# Patient Record
Sex: FEMALE | Race: WHITE | Employment: FULL TIME | URBAN - METROPOLITAN AREA
[De-identification: names, ages, dates, MRNs, and addresses within clinical notes are randomized per-mention and may not be internally consistent; named-entity substitution may affect disease eponyms.]

---

## 2017-04-13 ENCOUNTER — OFFICE VISIT (OUTPATIENT)
Dept: PHYSICAL THERAPY | Facility: HOSPITAL | Age: 22
End: 2017-04-13
Attending: UROLOGY
Payer: COMMERCIAL

## 2017-04-13 DIAGNOSIS — R39.89 BLADDER PAIN: Primary | ICD-10-CM

## 2017-04-13 PROCEDURE — 97162 PT EVAL MOD COMPLEX 30 MIN: CPT

## 2017-04-13 PROCEDURE — 97110 THERAPEUTIC EXERCISES: CPT

## 2017-04-13 PROCEDURE — 97535 SELF CARE MNGMENT TRAINING: CPT

## 2017-04-13 NOTE — PROGRESS NOTES
PELVIC FLOOR EVALUATION:   Referring Physician: Dr. Johana Overton  Diagnosis: Bladder pain (R39.89)  Date of Onset: 2/13/17     Date of Service: 4/13/2017     PATIENT SUMMARY   Jessy Kolb is a 19year old y/o female who presents to therapy today with c pain, dyspaerunia, constipation with pain during BM. Objective testing demonstrates signs and symptoms concurrent with vaginismus due to inability to perform internal examination past layer 1 with severe restrictions, pain and trigger points present.   Abl management   4.  Pt to improve PFDI score to less than 20% impaired    This plan was discussed and agreed upon by: patient       OBJECTIVE:     Red Flag Screening Yes/No Comments   Age over 48 no    Bowel or bladder Dysfunction/Saddle Anesthesisa no    Hist defecation La:  Yes   Laxative use: No    SEXUAL HEALTH STATUS  History of Sexual Abuse: no  Sexual [place] Status: participating - very unsatisfied with current status  Pain with initial penetration: yes  Pain with deep penetration: yes  How long does Umbilicus: 0    PELVIC EXAMINATION  Verbal consent given internal examination: Yes    External Observation  Mons pubis: WNL  Labia majora: WNL  Labia minora: redness  Urethral meatus: redness  Introitus: redness  Perineal body: other: elevated  Pelvic cloc co-sign or sign and return this letter via fax as soon as possible to [phone]. If you have any questions, please contact me at Dept: [phone]    Sincerely,  Electronically signed by:    Therapist: Elene Mortimer, PT, DPT, OCS  4/13/2017 10:40 AM

## 2017-04-20 ENCOUNTER — OFFICE VISIT (OUTPATIENT)
Dept: PHYSICAL THERAPY | Facility: HOSPITAL | Age: 22
End: 2017-04-20
Attending: UROLOGY
Payer: COMMERCIAL

## 2017-04-20 PROCEDURE — 97140 MANUAL THERAPY 1/> REGIONS: CPT

## 2017-04-20 NOTE — PROGRESS NOTES
Goals     • Therapy Goals          Long Term Goals (Time Frame 12 visits) by 6/30/17              1. Pt to report reduction of urination frequency with HEP activities to every 2-3 hours for improved bladder health              2. Pt to demonstrate improv valium medication to improve therapy outcomes. The patient would benefit from the skilled intervention of a physical therapist for the impairments and functional limitations noted above.      Rehab Potential: fair  Limiting factors: other: severity of tissu inflammation.  Tolerated session moderately, although continues to report same 9/10 pelvic pain post-tx without improvements.   Issued adductor stretch to add to HEP activities.  Plan to teach standing abdominal stretch and continue addressing soft tissue r

## 2017-04-27 ENCOUNTER — OFFICE VISIT (OUTPATIENT)
Dept: PHYSICAL THERAPY | Facility: HOSPITAL | Age: 22
End: 2017-04-27
Attending: UROLOGY
Payer: COMMERCIAL

## 2017-04-27 PROCEDURE — 97140 MANUAL THERAPY 1/> REGIONS: CPT

## 2017-04-27 NOTE — PROGRESS NOTES
Goals     • Therapy Goals          Long Term Goals (Time Frame 12 visits) by 6/30/17              1. Pt to report reduction of urination frequency with HEP activities to every 2-3 hours for improved bladder health              2. Pt to demonstrate improv valium medication to improve therapy outcomes. The patient would benefit from the skilled intervention of a physical therapist for the impairments and functional limitations noted above.      Rehab Potential: fair  Limiting factors: other: severity of tissu direction with aims to loosen adhesions, reduce pain, increase lymphatic flow, increase circulation and increase waste removal of inflammation.  Tolerated session moderately, although continues to report same 9/10 pelvic pain post-tx without improvements.

## 2017-05-04 ENCOUNTER — OFFICE VISIT (OUTPATIENT)
Dept: PHYSICAL THERAPY | Facility: HOSPITAL | Age: 22
End: 2017-05-04
Attending: UROLOGY
Payer: COMMERCIAL

## 2017-05-04 PROCEDURE — 97140 MANUAL THERAPY 1/> REGIONS: CPT

## 2017-05-04 NOTE — PROGRESS NOTES
Goals     • Therapy Goals          Long Term Goals (Time Frame 12 visits) by 6/30/17              1. Pt to report reduction of urination frequency with HEP activities to every 2-3 hours for improved bladder health              2. Pt to demonstrate improv valium medication to improve therapy outcomes. The patient would benefit from the skilled intervention of a physical therapist for the impairments and functional limitations noted above.      Rehab Potential: fair  Limiting factors: other: severity of tissu of the abdominals, hip flexors and adductors found on initial evaluation and are contributing to pelvic floor restrictions with soft tissue mobilization including effleurage and pétrissage, myofascial release, and connective tissue skin rolling in centripe vaginal pain returned escalating over the past 3 days from minimal to 5-6/10 pain currently and reports feeling therapy is really helping.   Focused session on addressing soft tissue restrictions of the abdominals, hip flexors and adductors found on initial

## 2017-05-18 ENCOUNTER — OFFICE VISIT (OUTPATIENT)
Dept: PHYSICAL THERAPY | Facility: HOSPITAL | Age: 22
End: 2017-05-18
Attending: UROLOGY
Payer: COMMERCIAL

## 2017-05-18 PROCEDURE — 97140 MANUAL THERAPY 1/> REGIONS: CPT

## 2017-05-18 NOTE — PROGRESS NOTES
Goals     • Therapy Goals          Long Term Goals (Time Frame 12 visits) by 6/30/17              1. Pt to report reduction of urination frequency with HEP activities to every 2-3 hours for improved bladder health              2. Pt to demonstrate improv valium medication to improve therapy outcomes. The patient would benefit from the skilled intervention of a physical therapist for the impairments and functional limitations noted above.      Rehab Potential: fair  Limiting factors: other: severity of tissu during cough and most likely contributing to the pain not getting better with the HEP activities yet.   Focused session on addressing soft tissue restrictions of the abdominals, hip flexors and adductors found on initial evaluation and are contributing to p OCS  4/27/2017 1:32 PM    5/4/2017: Pt returns to clinic with improving symptoms to nearly no pain noted for 2 days after our last treatment and then the vaginal pain returned escalating over the past 3 days from minimal to 5-6/10 pain currently and report diaphragmatic breathing. Tolerated well and tissue released to treatment, reports pain reduced to 2/10 post-tx.   Educated pt on positioning and use of therawand 3x/week for 5-10 minutes this week to maintain proper resting tone of pelvic floor, verbalized

## 2017-05-25 ENCOUNTER — OFFICE VISIT (OUTPATIENT)
Dept: PHYSICAL THERAPY | Facility: HOSPITAL | Age: 22
End: 2017-05-25
Attending: UROLOGY
Payer: COMMERCIAL

## 2017-05-25 PROCEDURE — 97140 MANUAL THERAPY 1/> REGIONS: CPT

## 2017-05-25 PROCEDURE — 97110 THERAPEUTIC EXERCISES: CPT

## 2017-06-01 ENCOUNTER — APPOINTMENT (OUTPATIENT)
Dept: PHYSICAL THERAPY | Facility: HOSPITAL | Age: 22
End: 2017-06-01
Attending: UROLOGY
Payer: COMMERCIAL

## 2017-06-01 NOTE — PROGRESS NOTES
Goals     • Therapy Goals          Long Term Goals (Time Frame 12 visits) by 6/30/17              1. Pt to report reduction of urination frequency with HEP activities to every 2-3 hours for improved bladder health              2. Pt to demonstrate improv valium medication to improve therapy outcomes. The patient would benefit from the skilled intervention of a physical therapist for the impairments and functional limitations noted above.      Rehab Potential: fair  Limiting factors: other: severity of tissu coughing lots this week and wonders if this is causing increased pelvic pain. Educated pt on use of pelvic floor to support organ during cough and most likely contributing to the pain not getting better with the HEP activities yet.   Focused session on add tissue restrictions manually and incorporate internal pelvic muscles as able to tolerate.       Therapist: Buddy Mitchell, PT, DPT, OCS  4/27/2017 1:32 PM    5/4/2017: Pt returns to clinic with improving symptoms to nearly no pain noted for 2 days after o sup trans perineal and  Bulbocavernosus muscles which are addressed with theils massage and soft tissue distraction stretching with diaphragmatic breathing. Tolerated well and tissue released to treatment, reports pain reduced to 2/10 post-tx.   Educated p referral for gynecology due to not having a current gynecologist.  [facility] [place] scheduling information. No treatment provided today and plan to continue PT after discharge is diagnosed and resolved or being treated.    Therapist: Sherrod Harada, P

## 2017-06-22 ENCOUNTER — OFFICE VISIT (OUTPATIENT)
Dept: PHYSICAL THERAPY | Facility: HOSPITAL | Age: 22
End: 2017-06-22
Attending: UROLOGY
Payer: COMMERCIAL

## 2017-06-22 PROCEDURE — 97140 MANUAL THERAPY 1/> REGIONS: CPT

## 2017-06-22 NOTE — PROGRESS NOTES
Goals     • Therapy Goals          Long Term Goals (Time Frame 12 visits) by 6/30/17              1. Pt to report reduction of urination frequency with HEP activities to every 2-3 hours for improved bladder health              2. Pt to demonstrate improv valium medication to improve therapy outcomes. The patient would benefit from the skilled intervention of a physical therapist for the impairments and functional limitations noted above.      Rehab Potential: fair  Limiting factors: other: severity of tissu 9/10 vaginal and lower abdominal cramping pain currently and reports she has been coughing lots this week and wonders if this is causing increased pelvic pain.   Educated pt on use of pelvic floor to support organ during cough and most likely contributing t post-tx.    Plan to teach standing abdominal stretch and continue addressing soft tissue restrictions manually and incorporate internal pelvic muscles as able to tolerate.       Therapist: Huma Crowell, PT, DPT, OCS  4/27/2017 1:32 PM    5/4/2017: Pt ret from worsening. Demonstrates moderate hypertonicity and trigger points in the L sup trans perineal and  Bulbocavernosus muscles which are addressed with theils massage and soft tissue distraction stretching with diaphragmatic breathing.   Tolerated well an resolved. Pt requests to cancel appointment but is seeking guidance on a referral for gynecology due to not having a current gynecologist.  [facility] scheduling information.   No treatment provided today and plan to continue PT after discharge i

## 2017-06-29 ENCOUNTER — OFFICE VISIT (OUTPATIENT)
Dept: PHYSICAL THERAPY | Facility: HOSPITAL | Age: 22
End: 2017-06-29
Attending: UROLOGY
Payer: COMMERCIAL

## 2017-06-29 PROCEDURE — 97140 MANUAL THERAPY 1/> REGIONS: CPT

## 2017-06-29 NOTE — PROGRESS NOTES
Goals     • Therapy Goals                Long Term Goals (Time Frame 12 visits) by 6/30/17              1. Pt to report reduction of urination frequency with HEP activities to every 2-3 hours for improved bladder health              2. Pt to demonstrate vaginal valium medication to improve therapy outcomes. The patient would benefit from the skilled intervention of a physical therapist for the impairments and functional limitations noted above.      Rehab Potential: fair  Limiting factors: other: severity with aims to loosen adhesions, reduce pain, increase lymphatic flow, increase circulation and increase waste removal of inflammation.  Tolerated session moderately, although continues to report same 9/10 pelvic pain post-tx without improvements.   Issued ad contributing to pelvic floor restrictions with soft tissue mobilization including effleurage and pétrissage, myofascial release, and connective tissue skin rolling in centripedal direction with aims to loosen adhesions, reduce pain, increase lymphatic flow WILLIET, OCS  5/18/2017 12:29 PM    5/25/2017: Pt returns to clinic without pain currently and reports the only pain she has had in the past week is lower abdominal (sub-umbilicus) pain for up to 30 minutes following intercourse in which she did not achieve an hypertonicity and trigger points in the L/R sup trans perineal and B Bulbocavernosus muscles which are addressed with theils massage and soft tissue distraction stretching with diaphragmatic breathing.   Tolerated well and tissue released to treatment, repo

## 2020-10-19 NOTE — PROGRESS NOTES
Goals     • Therapy Goals          Long Term Goals (Time Frame 12 visits) by 6/30/17              1. Pt to report reduction of urination frequency with HEP activities to every 2-3 hours for improved bladder health              2. Pt to demonstrate improv valium medication to improve therapy outcomes. The patient would benefit from the skilled intervention of a physical therapist for the impairments and functional limitations noted above.      Rehab Potential: fair  Limiting factors: other: severity of tissu coughing lots this week and wonders if this is causing increased pelvic pain. Educated pt on use of pelvic floor to support organ during cough and most likely contributing to the pain not getting better with the HEP activities yet.   Focused session on add tissue restrictions manually and incorporate internal pelvic muscles as able to tolerate.       Therapist: Ros Marrero PT, DPT, OCS  4/27/2017 1:32 PM    5/4/2017: Pt returns to clinic with improving symptoms to nearly no pain noted for 2 days after o sup trans perineal and  Bulbocavernosus muscles which are addressed with theils massage and soft tissue distraction stretching with diaphragmatic breathing. Tolerated well and tissue released to treatment, reports pain reduced to 2/10 post-tx.   Educated p No

## 2022-02-08 ENCOUNTER — OFFICE VISIT (OUTPATIENT)
Dept: OBGYN CLINIC | Facility: CLINIC | Age: 27
End: 2022-02-08
Payer: COMMERCIAL

## 2022-02-08 VITALS
DIASTOLIC BLOOD PRESSURE: 70 MMHG | BODY MASS INDEX: 30.58 KG/M2 | SYSTOLIC BLOOD PRESSURE: 110 MMHG | WEIGHT: 179.13 LBS | HEIGHT: 64 IN

## 2022-02-08 DIAGNOSIS — Z30.014 ENCOUNTER FOR INITIAL PRESCRIPTION OF INTRAUTERINE CONTRACEPTIVE DEVICE (IUD): ICD-10-CM

## 2022-02-08 DIAGNOSIS — Z01.419 WOMEN'S ANNUAL ROUTINE GYNECOLOGICAL EXAMINATION: Primary | ICD-10-CM

## 2022-02-08 DIAGNOSIS — Z11.3 ROUTINE SCREENING FOR STI (SEXUALLY TRANSMITTED INFECTION): ICD-10-CM

## 2022-02-08 PROBLEM — Z30.09 CONTRACEPTIVE EDUCATION: Status: ACTIVE | Noted: 2022-02-08

## 2022-02-08 PROBLEM — Z30.011 ENCOUNTER FOR INITIAL PRESCRIPTION OF CONTRACEPTIVE PILLS: Status: ACTIVE | Noted: 2022-02-08

## 2022-02-08 PROBLEM — Z30.09 CONTRACEPTIVE EDUCATION: Status: RESOLVED | Noted: 2022-02-08 | Resolved: 2022-02-08

## 2022-02-08 PROCEDURE — 87591 N.GONORRHOEAE DNA AMP PROB: CPT | Performed by: OBSTETRICS & GYNECOLOGY

## 2022-02-08 PROCEDURE — 3078F DIAST BP <80 MM HG: CPT | Performed by: OBSTETRICS & GYNECOLOGY

## 2022-02-08 PROCEDURE — 3008F BODY MASS INDEX DOCD: CPT | Performed by: OBSTETRICS & GYNECOLOGY

## 2022-02-08 PROCEDURE — 87205 SMEAR GRAM STAIN: CPT | Performed by: OBSTETRICS & GYNECOLOGY

## 2022-02-08 PROCEDURE — 3074F SYST BP LT 130 MM HG: CPT | Performed by: OBSTETRICS & GYNECOLOGY

## 2022-02-08 PROCEDURE — 99203 OFFICE O/P NEW LOW 30 MIN: CPT | Performed by: OBSTETRICS & GYNECOLOGY

## 2022-02-08 PROCEDURE — 87491 CHLMYD TRACH DNA AMP PROBE: CPT | Performed by: OBSTETRICS & GYNECOLOGY

## 2022-02-08 PROCEDURE — 87808 TRICHOMONAS ASSAY W/OPTIC: CPT | Performed by: OBSTETRICS & GYNECOLOGY

## 2022-02-08 PROCEDURE — 87106 FUNGI IDENTIFICATION YEAST: CPT | Performed by: OBSTETRICS & GYNECOLOGY

## 2022-02-08 PROCEDURE — 99385 PREV VISIT NEW AGE 18-39: CPT | Performed by: OBSTETRICS & GYNECOLOGY

## 2022-02-08 RX ORDER — MISOPROSTOL 200 UG/1
200 TABLET ORAL
Qty: 1 TABLET | Refills: 0 | Status: SHIPPED | OUTPATIENT
Start: 2022-02-08 | End: 2022-02-09

## 2022-02-09 LAB
C TRACH DNA SPEC QL NAA+PROBE: NEGATIVE
N GONORRHOEA DNA SPEC QL NAA+PROBE: NEGATIVE

## 2022-02-10 LAB
GENITAL VAGINOSIS SCREEN: POSITIVE
TRICHOMONAS SCREEN: NEGATIVE

## 2022-02-22 ENCOUNTER — PATIENT MESSAGE (OUTPATIENT)
Dept: OBGYN CLINIC | Facility: CLINIC | Age: 27
End: 2022-02-22

## 2022-02-23 RX ORDER — FLUCONAZOLE 150 MG/1
TABLET ORAL
Qty: 2 TABLET | Refills: 0 | Status: SHIPPED | OUTPATIENT
Start: 2022-02-23

## 2024-01-04 ENCOUNTER — OFFICE VISIT (OUTPATIENT)
Dept: OBGYN CLINIC | Facility: CLINIC | Age: 29
End: 2024-01-04
Payer: COMMERCIAL

## 2024-01-04 VITALS
BODY MASS INDEX: 29.53 KG/M2 | DIASTOLIC BLOOD PRESSURE: 78 MMHG | WEIGHT: 173 LBS | HEIGHT: 64 IN | SYSTOLIC BLOOD PRESSURE: 120 MMHG

## 2024-01-04 DIAGNOSIS — Z12.4 SCREENING FOR CERVICAL CANCER: ICD-10-CM

## 2024-01-04 DIAGNOSIS — Z01.419 WOMEN'S ANNUAL ROUTINE GYNECOLOGICAL EXAMINATION: Primary | ICD-10-CM

## 2024-01-04 DIAGNOSIS — Z30.41 ENCOUNTER FOR SURVEILLANCE OF CONTRACEPTIVE PILLS: ICD-10-CM

## 2024-01-04 PROCEDURE — 3074F SYST BP LT 130 MM HG: CPT | Performed by: OBSTETRICS & GYNECOLOGY

## 2024-01-04 PROCEDURE — 88175 CYTOPATH C/V AUTO FLUID REDO: CPT | Performed by: OBSTETRICS & GYNECOLOGY

## 2024-01-04 PROCEDURE — 87624 HPV HI-RISK TYP POOLED RSLT: CPT | Performed by: OBSTETRICS & GYNECOLOGY

## 2024-01-04 PROCEDURE — 99395 PREV VISIT EST AGE 18-39: CPT | Performed by: OBSTETRICS & GYNECOLOGY

## 2024-01-04 PROCEDURE — 3008F BODY MASS INDEX DOCD: CPT | Performed by: OBSTETRICS & GYNECOLOGY

## 2024-01-04 PROCEDURE — 3078F DIAST BP <80 MM HG: CPT | Performed by: OBSTETRICS & GYNECOLOGY

## 2024-01-04 RX ORDER — NORETHINDRONE ACETATE AND ETHINYL ESTRADIOL AND FERROUS FUMARATE 1MG-20(21)
1 KIT ORAL DAILY
COMMUNITY
Start: 2023-04-20 | End: 2024-01-04

## 2024-01-04 RX ORDER — NORETHINDRONE ACETATE AND ETHINYL ESTRADIOL AND FERROUS FUMARATE 1MG-20(21)
1 KIT ORAL DAILY
Qty: 84 TABLET | Refills: 3 | Status: SHIPPED | OUTPATIENT
Start: 2024-01-04

## 2024-01-04 NOTE — PROGRESS NOTES
NEW PT    CC: Patient  for pap and birth control refill    HPI: Patient is a 28 year old  was seen in  early December for STI screen after she had broken condom. She had false positive HIV test with negative confirmatory test. No hx of high risk activity.     She is using OCP's without problems for 2 years. She would like refill.         No LMP recorded. (Menstrual status: Continuous Pill).    OB History    Para Term  AB Living   0 0 0 0 0 0   SAB IAB Ectopic Multiple Live Births   0 0 0 0 0       GYN hx:    Hx Prior Abnormal Pap: No  Pap Date: 21  Pap Result Notes: per pt last pap done 2021=wnl  LPS: (undocumented)  2020 + chlamydia  Was treated for PID in        CONTRACEPTION: ocps  SEXUALLY ACTIVE:: yes, uses condoms      History reviewed. No pertinent past medical history.  Past Surgical History:   Procedure Laterality Date    BACK SURGERY  2018    microdectomy on back      No Known Allergies  Family History   Problem Relation Age of Onset    No Known Problems Mother     No Known Problems Father     Breast Cancer Maternal Grandmother 65    No Known Problems Maternal Grandfather     Diabetes Paternal Grandmother     Diabetes Paternal Grandfather     Ovarian Cancer Neg     Colon Cancer Neg      Social History     Socioeconomic History    Marital status: Single     Spouse name: Not on file    Number of children: Not on file    Years of education: Not on file    Highest education level: Not on file   Occupational History    Occupation:      Comment: [employer]   Tobacco Use    Smoking status: Never    Smokeless tobacco: Never   Vaping Use    Vaping Use: Never used   Substance and Sexual Activity    Alcohol use: Yes     Comment: occ    Drug use: Never    Sexual activity: Yes     Partners: Male     Birth control/protection: Condom   Other Topics Concern     Service Not Asked    Blood Transfusions No    Caffeine Concern Not Asked     Occupational Exposure Not Asked    Hobby Hazards Not Asked    Sleep Concern Not Asked    Stress Concern Not Asked    Weight Concern Not Asked    Special Diet Not Asked    Back Care Not Asked    Exercise Not Asked    Bike Helmet Not Asked    Seat Belt Not Asked    Self-Exams Not Asked   Social History Narrative    Pt denies h/x abuse.    Pt works full time.    Pt lives with parents.    Feels safe    No hx of abuse     Social Determinants of Health     Financial Resource Strain: Not on file   Food Insecurity: Not on file   Transportation Needs: Not on file   Physical Activity: Not on file   Stress: Not on file   Social Connections: Not on file   Housing Stability: Not on file       Medications reviewed. See active list.     /78   Ht 64\"   Wt 173 lb (78.5 kg)   BMI 29.70 kg/m²       Exam:   GENERAL: well developed, well nourished, in no apparent distress  ABDOMEN: Soft, non distended; non tender, no masses.  Liver and spleen non-tender, no enlargement. No palpable hernias  GYNE/:  External Genitalia: Normal appearing, no lesions, normal hair distribution   Urethral meatus appear wnl, no abnormal discharge or lesions noted.   Bladder: well supported, urethra wnl, no palpable tenderness or masses, no discharge  Vagina: normal pink mucosa, no lesions, normal clear discharge.   Uterus: midline, mobile, non-tender, firm and smooth  Cervix: pink, no lesions grossly visible, no discharge  Adnexa: non tender, no palpable masses, normal size  Anus:  No lesions or visible hemorrhoids        A/P: Patient is 28 year old female     1. Women's annual routine gynecological examination    2. Encounter for surveillance of contraceptive pills  - [facility] 1/20 1-20 MG-MCG Oral Tab; Take 1 tablet by mouth daily.  Dispense: 84 tablet; Refill: 3    Pap sent.   DW pt that FU HIV testing was negative and that she did not have HIV.     Vicki El MD

## 2024-01-05 LAB — HPV I/H RISK 1 DNA SPEC QL NAA+PROBE: POSITIVE

## 2024-01-11 ENCOUNTER — TELEPHONE (OUTPATIENT)
Dept: OBGYN CLINIC | Facility: CLINIC | Age: 29
End: 2024-01-11

## 2024-01-11 PROBLEM — R87.810 CERVICAL HIGH RISK HUMAN PAPILLOMAVIRUS (HPV) DNA TEST POSITIVE: Status: ACTIVE | Noted: 2024-01-11

## 2024-07-12 ENCOUNTER — APPOINTMENT (OUTPATIENT)
Dept: CT IMAGING | Facility: HOSPITAL | Age: 29
End: 2024-07-12
Attending: EMERGENCY MEDICINE
Payer: COMMERCIAL

## 2024-07-12 ENCOUNTER — HOSPITAL ENCOUNTER (EMERGENCY)
Facility: HOSPITAL | Age: 29
Discharge: HOME | End: 2024-07-12
Attending: EMERGENCY MEDICINE
Payer: COMMERCIAL

## 2024-07-12 VITALS
SYSTOLIC BLOOD PRESSURE: 124 MMHG | WEIGHT: 172.81 LBS | OXYGEN SATURATION: 98 % | HEART RATE: 72 BPM | TEMPERATURE: 99 F | DIASTOLIC BLOOD PRESSURE: 81 MMHG | BODY MASS INDEX: 30 KG/M2 | RESPIRATION RATE: 12 BRPM

## 2024-07-12 DIAGNOSIS — G43.009 MIGRAINE WITHOUT AURA AND WITHOUT STATUS MIGRAINOSUS, NOT INTRACTABLE: Primary | ICD-10-CM

## 2024-07-12 LAB
ALBUMIN SERPL-MCNC: 4.6 G/DL (ref 3.2–4.8)
ALBUMIN/GLOB SERPL: 1.4 {RATIO} (ref 1–2)
ALP LIVER SERPL-CCNC: 43 U/L
ALT SERPL-CCNC: 14 U/L
AMPHET UR QL SCN: NEGATIVE
ANION GAP SERPL CALC-SCNC: 5 MMOL/L (ref 0–18)
APTT PPP: 28.6 SECONDS (ref 23–36)
AST SERPL-CCNC: 20 U/L (ref ?–34)
ATRIAL RATE: 102 BPM
B-HCG UR QL: NEGATIVE
BASOPHILS # BLD AUTO: 0.03 X10(3) UL (ref 0–0.2)
BASOPHILS NFR BLD AUTO: 0.3 %
BENZODIAZ UR QL SCN: NEGATIVE
BILIRUB SERPL-MCNC: 1.7 MG/DL (ref 0.3–1.2)
BUN BLD-MCNC: 8 MG/DL (ref 9–23)
BUN/CREAT SERPL: 9.8 (ref 10–20)
CALCIUM BLD-MCNC: 9.8 MG/DL (ref 8.7–10.4)
CANNABINOIDS UR QL SCN: NEGATIVE
CHLORIDE SERPL-SCNC: 111 MMOL/L (ref 98–112)
CO2 SERPL-SCNC: 27 MMOL/L (ref 21–32)
COCAINE UR QL: NEGATIVE
CREAT BLD-MCNC: 0.82 MG/DL
CREAT UR-SCNC: 24.5 MG/DL
DEPRECATED RDW RBC AUTO: 37.9 FL (ref 35.1–46.3)
EGFRCR SERPLBLD CKD-EPI 2021: 99 ML/MIN/1.73M2 (ref 60–?)
EOSINOPHIL # BLD AUTO: 0.06 X10(3) UL (ref 0–0.7)
EOSINOPHIL NFR BLD AUTO: 0.7 %
ERYTHROCYTE [DISTWIDTH] IN BLOOD BY AUTOMATED COUNT: 11.9 % (ref 11–15)
ETHANOL SERPL-MCNC: 24 MG/DL (ref ?–3)
FENTANYL UR QL SCN: NEGATIVE
GLOBULIN PLAS-MCNC: 3.4 G/DL (ref 2–3.5)
GLUCOSE BLD-MCNC: 98 MG/DL (ref 70–99)
GLUCOSE BLDC GLUCOMTR-MCNC: 107 MG/DL (ref 70–99)
HCT VFR BLD AUTO: 43.3 %
HGB BLD-MCNC: 15.3 G/DL
IMM GRANULOCYTES # BLD AUTO: 0.02 X10(3) UL (ref 0–1)
IMM GRANULOCYTES NFR BLD: 0.2 %
INR BLD: 0.95 (ref 0.8–1.2)
LYMPHOCYTES # BLD AUTO: 2.05 X10(3) UL (ref 1–4)
LYMPHOCYTES NFR BLD AUTO: 22.9 %
MCH RBC QN AUTO: 30.8 PG (ref 26–34)
MCHC RBC AUTO-ENTMCNC: 35.3 G/DL (ref 31–37)
MCV RBC AUTO: 87.3 FL
MDMA UR QL SCN: NEGATIVE
MONOCYTES # BLD AUTO: 0.51 X10(3) UL (ref 0.1–1)
MONOCYTES NFR BLD AUTO: 5.7 %
NEUTROPHILS # BLD AUTO: 6.3 X10 (3) UL (ref 1.5–7.7)
NEUTROPHILS # BLD AUTO: 6.3 X10(3) UL (ref 1.5–7.7)
NEUTROPHILS NFR BLD AUTO: 70.2 %
OPIATES UR QL SCN: NEGATIVE
OSMOLALITY SERPL CALC.SUM OF ELEC: 294 MOSM/KG (ref 275–295)
OXYCODONE UR QL SCN: NEGATIVE
P AXIS: 77 DEGREES
P-R INTERVAL: 130 MS
PLATELET # BLD AUTO: 299 10(3)UL (ref 150–450)
POTASSIUM SERPL-SCNC: 4.1 MMOL/L (ref 3.5–5.1)
PROT SERPL-MCNC: 8 G/DL (ref 5.7–8.2)
PROTHROMBIN TIME: 13.3 SECONDS (ref 11.6–14.8)
Q-T INTERVAL: 344 MS
QRS DURATION: 90 MS
QTC CALCULATION (BEZET): 448 MS
R AXIS: 92 DEGREES
RBC # BLD AUTO: 4.96 X10(6)UL
SODIUM SERPL-SCNC: 143 MMOL/L (ref 136–145)
T AXIS: 54 DEGREES
VENTRICULAR RATE: 102 BPM
WBC # BLD AUTO: 9 X10(3) UL (ref 4–11)

## 2024-07-12 PROCEDURE — 93010 ELECTROCARDIOGRAM REPORT: CPT

## 2024-07-12 PROCEDURE — 70496 CT ANGIOGRAPHY HEAD: CPT | Performed by: EMERGENCY MEDICINE

## 2024-07-12 PROCEDURE — 70450 CT HEAD/BRAIN W/O DYE: CPT | Performed by: EMERGENCY MEDICINE

## 2024-07-12 PROCEDURE — 99285 EMERGENCY DEPT VISIT HI MDM: CPT

## 2024-07-12 PROCEDURE — 81025 URINE PREGNANCY TEST: CPT

## 2024-07-12 PROCEDURE — 93005 ELECTROCARDIOGRAM TRACING: CPT

## 2024-07-12 PROCEDURE — 80053 COMPREHEN METABOLIC PANEL: CPT | Performed by: EMERGENCY MEDICINE

## 2024-07-12 PROCEDURE — 82077 ASSAY SPEC XCP UR&BREATH IA: CPT | Performed by: EMERGENCY MEDICINE

## 2024-07-12 PROCEDURE — 96361 HYDRATE IV INFUSION ADD-ON: CPT

## 2024-07-12 PROCEDURE — 85025 COMPLETE CBC W/AUTO DIFF WBC: CPT | Performed by: EMERGENCY MEDICINE

## 2024-07-12 PROCEDURE — 80307 DRUG TEST PRSMV CHEM ANLYZR: CPT | Performed by: EMERGENCY MEDICINE

## 2024-07-12 PROCEDURE — 70498 CT ANGIOGRAPHY NECK: CPT | Performed by: EMERGENCY MEDICINE

## 2024-07-12 PROCEDURE — 85610 PROTHROMBIN TIME: CPT | Performed by: EMERGENCY MEDICINE

## 2024-07-12 PROCEDURE — 85730 THROMBOPLASTIN TIME PARTIAL: CPT | Performed by: EMERGENCY MEDICINE

## 2024-07-12 PROCEDURE — 96374 THER/PROPH/DIAG INJ IV PUSH: CPT

## 2024-07-12 PROCEDURE — 82962 GLUCOSE BLOOD TEST: CPT

## 2024-07-12 RX ORDER — DIPHENHYDRAMINE HCL 25 MG
25 CAPSULE ORAL ONCE
Status: COMPLETED | OUTPATIENT
Start: 2024-07-12 | End: 2024-07-12

## 2024-07-12 RX ORDER — ACETAMINOPHEN 500 MG
1000 TABLET ORAL ONCE
Status: COMPLETED | OUTPATIENT
Start: 2024-07-12 | End: 2024-07-12

## 2024-07-12 RX ORDER — PROCHLORPERAZINE EDISYLATE 5 MG/ML
10 INJECTION INTRAMUSCULAR; INTRAVENOUS ONCE
Status: COMPLETED | OUTPATIENT
Start: 2024-07-12 | End: 2024-07-12

## 2024-07-12 NOTE — ED INITIAL ASSESSMENT (HPI)
S: around 315/330 pt began experiencing left sided facial numbness and blurriness to left eye. Pt states that sensation on the left side of her face is different than the right.     A: Sensation to the left face is the only deficit noted on assessment.

## 2024-07-12 NOTE — ED QUICK NOTES
Pt reporting increased ha while laying flat on CT table. No changes to numbness or visual changes.

## 2024-07-12 NOTE — ED PROVIDER NOTES
Patient Seen in: [facility] Emergency Department      History     Chief Complaint   Patient presents with    Numbness    Stroke     Stated Complaint: facial numbness, vision changes    Subjective:   HPI    28-year-old female with past medical history notable for migraines presenting to the emergency department with acute complaints of headache with left-sided vision changes and left facial paresthesias.    Patient states that headache was first noted at approximately 230.  States that it gradually worsened to maximal intensity at 330.  Patient states that 330 she began to notice left facial paresthesias and left-sided blurred vision.  Does not report any acute falls or trauma.  Does not report specific aggravating relieving factors otherwise.  Does detail that she did have less than 1 glass of wine had approximately 3.  Does not detail other drug use.    Patient denies fevers, neck or back pain, chest pain, shortness of breath, lower extremity edematous changes, abdominal pain, dysuria.    Objective:   History reviewed. No pertinent past medical history.           Past Surgical History:   Procedure Laterality Date    Back surgery  2018    microdiscectomy on back                Social History     Socioeconomic History    Marital status: Single   Occupational History    Occupation:      Comment: [employer]   Tobacco Use    Smoking status: Never    Smokeless tobacco: Never   Vaping Use    Vaping status: Never Used   Substance and Sexual Activity    Alcohol use: Yes     Comment: occ    Drug use: Never    Sexual activity: Yes     Partners: Male     Birth control/protection: Condom   Other Topics Concern    Blood Transfusions No   Social History Narrative    Pt denies h/x abuse.    Pt works full time.    Pt lives with parents.    Feels safe    No hx of abuse              Review of Systems    Positive for stated Chief Complaint: Numbness and Stroke    Other systems are as noted  in HPI.  Constitutional and vital signs reviewed.      All other systems reviewed and negative except as noted above.    Physical Exam     ED Triage Vitals [07/12/24 1630]   BP (!) 174/85   Pulse 105   Resp 20   Temp 98.6 °F (37 °C)   Temp src    SpO2 100 %   O2 Device None (Room air)       Current Vitals:   Vital Signs  BP: 124/81  Pulse: 72  Resp: 12  Temp: 98.6 °F (37 °C)  MAP (mmHg): 94    Oxygen Therapy  SpO2: 98 %  O2 Device: None (Room air)            Physical Exam    Physical Exam:   /81   Pulse 72   Temp 98.6 °F (37 °C)   Resp 12   Wt 78.4 kg   SpO2 98%   BMI 29.67 kg/m²  - I reviewed these vital signs    Constitutional: Pt is well appearing, in no distress  HEENT: Normocephalic/Atraumatic, PERRL, EOM grossly normal, Conjunctiva Clear, MMM without Erythema or Lesions, Uvula midline, No Palatal/Oropharyngeal Erythema/Lesions noted  Neck: Range of motion intact, no midline tenderness to palpation  Lungs: CTA B, No crepitus, Talking in full sentences in no respiratory distress  Cardiovascular: RRR: Yes  Back: No erythema or rash or ecchymosis noted, no midline tenderness to palpation  Rectal: deferred  : deferred  Musculoskeletal: No deformities noted, No cyanosis/clubbing noted, No tenderness to palpation  Neurologic: NIHSS: 1, A&O x 3, Speech clear, No facial asymmetry noted, DTR intact in UE and LE bilaterally, intact sensation throughout V1-3 distribution, Shoulder shrug equal bilaterally, midline tongue protrusion, decreased auditory capabilities and facial sensation on left compared to right, 5/5 strength and sensation in UE and LE bilaterally,  no truncal ataxia, no gait ataxia, ambulatory without difficulties  Psychiatric: Mood and affect are appropriate, Speech not pressured, Thought process is logical  Skin: Warm and dry, No rash      ED Course     Labs Reviewed   COMP METABOLIC PANEL (14) - Abnormal; Notable for the following components:       Result Value    BUN 8 (*)     BUN/CREA  Ratio 9.8 (*)     Bilirubin, Total 1.7 (*)     All other components within normal limits   ETHYL ALCOHOL - Abnormal; Notable for the following components:    Ethyl Alcohol 24 (*)     All other components within normal limits   POCT GLUCOSE - Abnormal; Notable for the following components:    POC Glucose  107 (*)     All other components within normal limits   PROTHROMBIN TIME (PT) - Normal   PTT, ACTIVATED - Normal   POCT PREGNANCY URINE - Normal   CBC WITH DIFFERENTIAL WITH PLATELET    Narrative:     The following orders were created for panel order CBC With Differential With Platelet.  Procedure                               Abnormality         Status                     ---------                               -----------         ------                     CBC W/ DIFFERENTIAL[[id number]]                              Final result                 Please view results for these tests on the individual orders.   DRUG SCREEN 8 W/CONFIRMATION, URINE   RAINBOW DRAW LAVENDER   RAINBOW DRAW LIGHT GREEN   RAINBOW DRAW BLUE   RAINBOW DRAW GOLD   CBC W/ DIFFERENTIAL          MDM             Medical Decision Making  28-year-old female presenting to the emergency department with headache symptoms in addition to left eye visual changes, left-sided facial paresthesias and left sided hearing changes.    On arrival to the emergency department patient overall well-appearing.  NIH of 1.  Stroke code called prior to the patient's arrival.  Imaging obtained as detailed in ED course.  Patient protecting airway with no need for acute intervention.  Vital signs stable.    Do not suspect acute trauma such as acute intracranial hemorrhage as no trauma is reported.  Do not suspect C-spine versus other axial skeletal fracture versus subluxation as no trauma is reported.    Appropriate labs and imaging ordered per differential as detailed in this note.    ED Course as of 07/13/24  0002  ------------------------------------------------------------  Time: 07/12 1647  Comment: EKG read and interpreted by me showing rate 102, , QRS 90, QTc 448, sinus rhythm, no STEMI.  Do not suspect arrhythmia as a cause for the patient's symptoms.  Do not suspect atrial fibrillation with clot embolus.  ------------------------------------------------------------  Time: 07/12 1656  Value: POC GLUCOSE(!): 107  Comment: Lessening concern for hypoglycemia as a cause for the patient's presentation.  ------------------------------------------------------------  Time: 07/12 1656  Comment: CBC with no leukocytosis.  Patient afebrile.  Low likelihood of acute infection.  Hemoglobin and platelets normal.  Do not suspect anemia  ------------------------------------------------------------  Time: 07/12 1715  Comment: CT head read and interpreted by me showing no evidence of acute hemorrhage.  Radiology read with no acute intracranial hemorrhage or evidence of extended large vessel infarction.  ------------------------------------------------------------  Time: 07/12 5775  Comment: CMP with no acute electrolyte abnormality requiring repletion.  Bilirubin elevated at 1.7  ------------------------------------------------------------  Time: 07/12 1736  Value: ETHYL ALCOHOL(!): 24  Comment: Do not suspect acute alcohol intoxication as a cause for the patient's symptoms.  ------------------------------------------------------------  Time: 07/12 1737  Comment: CT angio with no hemodynamically significant stenosis or occlusion of the anterior posterior intracranial circulation.  Carotid arteries patent without evidence of hemodynamically stenosis or severe lesion.  Contiguous patency of bilateral vertebral arteries without evidence of flow-limiting stenosis or occlusion.  CT venogram without evidence of dural venous sinus thrombosis.  Do not suspect acute infarction.  Low likelihood of dural thrombus versus cavernous sinus  thrombosis.  Patient again afebrile.  Exam not consistent with this either as extraocular motor activities are without deficits.  Low likelihood of acute stroke.  ------------------------------------------------------------  Time: 07/12 1743  Value: APTT: 28.6  Comment: (Reviewed)  ------------------------------------------------------------  Time: 07/12 1753  Comment: Have discussed with neurology.  Agreeable with no need for additional imaging at this time.  Will treat headache in the emergency department with headache cocktail.  ------------------------------------------------------------  Time: 07/12 1753  Comment: Visual acuity 20/20 bilaterally  ------------------------------------------------------------  Time: 07/12 1801  Comment: Pregnancy testing negative  ------------------------------------------------------------  Time: 07/12 1805  Value: PT: 13.3  Comment: (Reviewed)  ------------------------------------------------------------  Time: 07/12 1805  Value: INR: 0.95  Comment: (Reviewed)  ------------------------------------------------------------  Time: 07/12 1916  Comment: Drug screen negative  ------------------------------------------------------------  Time: 07/12 1923  Comment: On reevaluation patient reports headache symptoms significantly improved.  Additionally states that visual changes have significantly improved.  Does not report paresthesias to left face.  On testing patient with equal sensation on right and left side of face.  Equal auditory capabilities on reevaluation as well.    At this time suspect complex migraine as a cause for the patient's symptoms.  Less likely tension type headache versus cluster headache.    Patient will be discharged at this time with proper follow-up instructions and return precautions.  Will be given neurology follow-up as well.         Amount and/or Complexity of Data Reviewed  Independent Historian: parent  External Data Reviewed: notes.  Labs: ordered.  Decision-making details documented in ED Course.  Radiology: ordered and independent interpretation performed. Decision-making details documented in ED Course.  ECG/medicine tests: ordered and independent interpretation performed. Decision-making details documented in ED Course.  Discussion of management or test interpretation with external provider(s): Radiology, neurology    Risk  OTC drugs.  Prescription drug management.        Disposition and Plan     Clinical Impression:  1. Migraine without aura and without status migrainosus, not intractable         Disposition:  Discharge  7/12/2024  7:32 pm    Follow-up:  Colleen Correia  [address]  [facility] [id number] [phone]    Follow up in 3 day(s)  for follow up    [facility] Emergency Department  [address] [facility] [address] [phone]  Follow up  As needed, If symptoms worsen    ANUSHKA Ephraim  [address]  [facility] [address]  Schedule an appointment as soon as possible for a visit  For follow up          Medications Prescribed:  Discharge Medication List as of 7/12/2024  7:37 PM

## 2024-07-13 NOTE — DISCHARGE INSTRUCTIONS
--Return for worsening symptoms or any other concerns as we discussed including the following but not limited to: Change in strength or sensation to arms or legs, change in speech, difficulty ambulating, change in mental status, change in vision or hearing  --Rest, instructions for home care as discussed

## 2024-11-06 DIAGNOSIS — Z30.41 ENCOUNTER FOR SURVEILLANCE OF CONTRACEPTIVE PILLS: ICD-10-CM

## 2024-11-06 RX ORDER — NORETHINDRONE ACETATE AND ETHINYL ESTRADIOL 1MG-20(21)
1 KIT ORAL DAILY
Qty: 84 TABLET | Refills: 0 | Status: SHIPPED | OUTPATIENT
Start: 2024-11-06

## (undated) NOTE — MR AVS SNAPSHOT
[facility] [facility] [address]  [phone]  [phone]               Thank you for choosing us for your health care visit with Hemant Bobby PT.   We are glad to serve you and happy to provide you wit not sign up before the expiration date, you must request a new code. Your unique [employer] Access Code: [email or web address]  Expires: 8/21/2017 11:34 AM    If you have questions, you can call [phone] to talk to our [facility] Staff.  Remember, [employer]

## (undated) NOTE — LETTER
22    RE: Davi Miller    : 1995    To Whom It May Concern:    Our patient, Davi Miller,      _x____Has received treatment in our office on _________________.        _____Has been hospitalized on the following dates ______________________.       _____ Has been ill and unable to work from _____________________________. _x____ May resume work / school on __________________________.      _____ May resume work / school with the following restrictions ______________    __________________________________________________________.      _____ May participate in physical education. _____ May participate in a prenatal exercise class / yoga class. _____ Patient is pregnant with a due date of Estimated Date of Delivery: None noted.       _____ Other _____________________________________________________     __________________________________________________________       Shaggy Murguia MD

## (undated) NOTE — MR AVS SNAPSHOT
[facility] [address]  [phone]  [phone]               Thank you for choosing us for your health care visit with Kayla Dudley PT.   We are glad to serve you and happy to provide you wit medication to improve therapy outcomes. The patient would benefit from the skilled intervention of a physical therapist for the impairments and functional limitations noted above.           Instructions and Information about Your Health     None      Your A your Zip Code and Date of Birth to complete the sign-up process. If you do not sign up before the expiration date, you must request a new code.     Your unique [employer] Access Code: [email or web address]  Expires: 6/12/2017 10:41 AM    If you have questions, you can c

## (undated) NOTE — MR AVS SNAPSHOT
[facility] [address]  [phone]               Thank you for choosing us for your health care visit with Jony Kahn PT.   We are glad to serve you and happy to provide you wit [address] [place] [address]   [phone]            May 25, 2017 11:30 AM   [facility] Pelvic Floor Visit with Catalina Garber PT, [facility] OF THE [facility] [address] [facility] ([address] [facility])    [address]  [facility]

## (undated) NOTE — MR AVS SNAPSHOT
[facility] [address]  [phone]  [phone]               Thank you for choosing us for your health care visit with Gal Laguerre PT.   We are glad to serve you and happy to provide you wit [place] [address]   [phone]            Jun 22, 2017 11:00 AM   [facility] Pelvic Floor Visit with Isreal Winters PT, [facility] OF THE [facility] [address] [facility] ([address])    [address]  [facility]

## (undated) NOTE — MR AVS SNAPSHOT
[address] [facility] [address]  [phone]  [phone]               Thank you for choosing us for your health care visit with Virginia Vega PT.   We are glad to serve you and happy to provide you wit [place] [address]   [phone]            Jun 01, 2017 11:30 AM   [facility] Pelvic Floor Visit with Yarely Marrufo PT, [facility] OF THE [facility] [address] [facility] ([address] [facility])    [address]  [facility]

## (undated) NOTE — MR AVS SNAPSHOT
[facility] [address]  [phone]  [phone]               Thank you for choosing us for your health care visit with Maury Neri PT.   We are glad to serve you and happy to provide you wit [address] [facility] [address]   [phone]            May 18, 2017 11:30 AM   [facility] Pelvic Floor Visit with Abby Morse PT, [facility] OF THE [facility] [address] [facility] ([facility] [facility])    [address] [facility]

## (undated) NOTE — MR AVS SNAPSHOT
[facility] [address] [facility]  [phone]  [phone]               Thank you for choosing us for your health care visit with Rabia Kaur PT.   We are glad to serve you and happy to provide you wit [place] [address]   [phone]            Abhishek 15, 2017 11:00 AM   [facility] Pelvic Floor Visit with Andrea Voss PT, [facility] OF THE [place] [address] [facility] ([address] [facility])    [address] [facility]

## (undated) NOTE — MR AVS SNAPSHOT
After Visit Summary   1/4/2024    Yahaira Jc   MRN: [id number]           Visit Information     Date & Time  1/4/2024  9:30 AM Provider  Vicki El MD [facility] - OB/GYN Dept. Phone  [phone]      Your Vitals Were  Most recent update: 1/4/2024  9:37 AM    BP   120/78    Ht   64\"    Wt   173 lb    BMI   29.70 kg/m²          Allergies as of 1/4/2024  Review status set to Review Complete on 1/4/2024   No Known Allergies     Your Current Medications        Dosage    YANIRA FE 1/20 1-20 MG-MCG Oral Tab Take 1 tablet by mouth daily.      Diagnoses for This Visit    Women's annual routine gynecological examination   [8190746]  -  Primary  Encounter for surveillance of contraceptive pills   [402057]    Screening for cervical cancer   [870868]             We Ordered the Following     Normal Orders This Visit    Hpv Dna  High Risk , Thin Prep Collect [[id number] CUSTOM]     THINPREP PAP SMEAR ONLY [[id number] CUSTOM]     ThinPrep PAP Smear [[id number] CUSTOM]     Future Labs/Procedures Expected by Expires    Hpv Dna  High Risk , Thin Prep Collect [[id number] CUSTOM]  1/4/2024 1/4/2025    ThinPrep PAP Smear [[id number] CUSTOM]  1/4/2024 1/4/2025                Did you know that [facility] [place] now offer Video Visits through [employer] for adult patients for a variety of conditions such as allergies, back pain and cold symptoms? Skip the drive and waiting room and online chat with a doctor face-to-face using your web-cam enabled computer or mobile device wherever you are. Video Visits cost $50 and can be paid hassle-free using a credit, debit, or health savings card.  Not active on [employer]? Ask us how to get signed up today!          If you receive a survey from [employer], please take a few minutes to complete it and provide feedback. We strive to deliver the best patient experience and are looking for ways to make improvements. Your feedback  will help us do so. For more information on [employer], please visit [email or web address]           No text in SmartText           No text in SmartText